# Patient Record
Sex: MALE | Race: WHITE | NOT HISPANIC OR LATINO | URBAN - METROPOLITAN AREA
[De-identification: names, ages, dates, MRNs, and addresses within clinical notes are randomized per-mention and may not be internally consistent; named-entity substitution may affect disease eponyms.]

---

## 2020-11-24 ENCOUNTER — OFFICE VISIT (OUTPATIENT)
Dept: URBAN - METROPOLITAN AREA CLINIC 118 | Facility: CLINIC | Age: 50
End: 2020-11-24
Payer: COMMERCIAL

## 2020-11-24 ENCOUNTER — WEB ENCOUNTER (OUTPATIENT)
Dept: URBAN - METROPOLITAN AREA CLINIC 118 | Facility: CLINIC | Age: 50
End: 2020-11-24

## 2020-11-24 DIAGNOSIS — R10.32 LLQ PAIN: ICD-10-CM

## 2020-11-24 PROCEDURE — G9903 PT SCRN TBCO ID AS NON USER: HCPCS | Performed by: INTERNAL MEDICINE

## 2020-11-24 PROCEDURE — G8482 FLU IMMUNIZE ORDER/ADMIN: HCPCS | Performed by: INTERNAL MEDICINE

## 2020-11-24 PROCEDURE — G8427 DOCREV CUR MEDS BY ELIG CLIN: HCPCS | Performed by: INTERNAL MEDICINE

## 2020-11-24 PROCEDURE — G8420 CALC BMI NORM PARAMETERS: HCPCS | Performed by: INTERNAL MEDICINE

## 2020-11-24 PROCEDURE — 3017F COLORECTAL CA SCREEN DOC REV: CPT | Performed by: INTERNAL MEDICINE

## 2020-11-24 PROCEDURE — 99213 OFFICE O/P EST LOW 20 MIN: CPT | Performed by: INTERNAL MEDICINE

## 2020-11-24 RX ORDER — OMEPRAZOLE 40 MG/1
CAPSULE, DELAYED RELEASE PELLETS ORAL
Qty: 0 | Refills: 0 | Status: ACTIVE | COMMUNITY
Start: 1900-01-01

## 2020-11-24 NOTE — HPI-TODAY'S VISIT:
This is a 49 yo male here for a follow up visit on abdominal pain.  Last seen in clinic in 09/2019 after colonoscopy. Colonoscopy in 2019showed a large submucosal nodule in the sigmoid colon, likely leiomyoma. Biopsies were not diagnostic. He had CT a/p which was unremarkable.  Since then, he has had intermittent episodes of LLQ abdominal pain and associated days of diarrhea. No weight loss or nausea/vomiting. No blood in the stools.

## 2020-12-15 ENCOUNTER — TELEPHONE ENCOUNTER (OUTPATIENT)
Dept: URBAN - METROPOLITAN AREA CLINIC 92 | Facility: CLINIC | Age: 50
End: 2020-12-15

## 2021-02-25 ENCOUNTER — OFFICE VISIT (OUTPATIENT)
Dept: URBAN - METROPOLITAN AREA CLINIC 118 | Facility: CLINIC | Age: 51
End: 2021-02-25

## 2023-07-19 ENCOUNTER — APPOINTMENT (RX ONLY)
Dept: URBAN - METROPOLITAN AREA CLINIC 33 | Facility: CLINIC | Age: 53
Setting detail: DERMATOLOGY
End: 2023-07-19

## 2023-07-19 DIAGNOSIS — D22 MELANOCYTIC NEVI: ICD-10-CM

## 2023-07-19 DIAGNOSIS — D18.0 HEMANGIOMA: ICD-10-CM

## 2023-07-19 DIAGNOSIS — L81.4 OTHER MELANIN HYPERPIGMENTATION: ICD-10-CM

## 2023-07-19 DIAGNOSIS — L82.1 OTHER SEBORRHEIC KERATOSIS: ICD-10-CM

## 2023-07-19 DIAGNOSIS — L81.5 LEUKODERMA, NOT ELSEWHERE CLASSIFIED: ICD-10-CM

## 2023-07-19 DIAGNOSIS — L919 UNSPECIFIED HYPERTROPHIC AND ATROPHIC CONDITIONS OF SKIN: ICD-10-CM

## 2023-07-19 DIAGNOSIS — L987 UNSPECIFIED HYPERTROPHIC AND ATROPHIC CONDITIONS OF SKIN: ICD-10-CM

## 2023-07-19 DIAGNOSIS — L909 UNSPECIFIED HYPERTROPHIC AND ATROPHIC CONDITIONS OF SKIN: ICD-10-CM

## 2023-07-19 DIAGNOSIS — L57.0 ACTINIC KERATOSIS: ICD-10-CM

## 2023-07-19 DIAGNOSIS — L57.3 POIKILODERMA OF CIVATTE: ICD-10-CM

## 2023-07-19 PROBLEM — D48.5 NEOPLASM OF UNCERTAIN BEHAVIOR OF SKIN: Status: ACTIVE | Noted: 2023-07-19

## 2023-07-19 PROBLEM — D18.01 HEMANGIOMA OF SKIN AND SUBCUTANEOUS TISSUE: Status: ACTIVE | Noted: 2023-07-19

## 2023-07-19 PROBLEM — D22.5 MELANOCYTIC NEVI OF TRUNK: Status: ACTIVE | Noted: 2023-07-19

## 2023-07-19 PROBLEM — D23.72 OTHER BENIGN NEOPLASM OF SKIN OF LEFT LOWER LIMB, INCLUDING HIP: Status: ACTIVE | Noted: 2023-07-19

## 2023-07-19 PROCEDURE — ? ADDITIONAL NOTES

## 2023-07-19 PROCEDURE — ? OBSERVATION AND MEASURE

## 2023-07-19 PROCEDURE — ? REFERRAL

## 2023-07-19 PROCEDURE — ? COUNSELING

## 2023-07-19 PROCEDURE — ? SUNSCREEN RECOMMENDATIONS

## 2023-07-19 PROCEDURE — ? PRESCRIPTION

## 2023-07-19 PROCEDURE — 99203 OFFICE O/P NEW LOW 30 MIN: CPT

## 2023-07-19 RX ORDER — FLUOROURACIL 5 MG/G
CREAM TOPICAL BID
Qty: 40 | Refills: 0 | Status: ERX | COMMUNITY
Start: 2023-07-19

## 2023-07-19 RX ADMIN — FLUOROURACIL: 5 CREAM TOPICAL at 00:00

## 2023-07-19 ASSESSMENT — LOCATION DETAILED DESCRIPTION DERM
LOCATION DETAILED: RIGHT CENTRAL MALAR CHEEK
LOCATION DETAILED: RIGHT PROXIMAL PRETIBIAL REGION
LOCATION DETAILED: RIGHT CLAVICULAR SKIN
LOCATION DETAILED: RIGHT SUPERIOR MEDIAL MIDBACK
LOCATION DETAILED: RIGHT BUTTOCK
LOCATION DETAILED: RIGHT SUPERIOR UPPER BACK
LOCATION DETAILED: RIGHT DISTAL PRETIBIAL REGION
LOCATION DETAILED: LEFT DISTAL PRETIBIAL REGION
LOCATION DETAILED: SUPERIOR THORACIC SPINE
LOCATION DETAILED: LEFT PROXIMAL PRETIBIAL REGION

## 2023-07-19 ASSESSMENT — LOCATION SIMPLE DESCRIPTION DERM
LOCATION SIMPLE: RIGHT CLAVICULAR SKIN
LOCATION SIMPLE: RIGHT UPPER BACK
LOCATION SIMPLE: RIGHT CHEEK
LOCATION SIMPLE: RIGHT LOWER BACK
LOCATION SIMPLE: UPPER BACK
LOCATION SIMPLE: RIGHT PRETIBIAL REGION
LOCATION SIMPLE: LEFT PRETIBIAL REGION
LOCATION SIMPLE: RIGHT BUTTOCK

## 2023-07-19 ASSESSMENT — LOCATION ZONE DERM
LOCATION ZONE: LEG
LOCATION ZONE: TRUNK
LOCATION ZONE: FACE

## 2023-07-19 NOTE — PROCEDURE: ADDITIONAL NOTES
Additional Notes: Patient accepts referral to general surgery
Render Risk Assessment In Note?: no
Detail Level: Simple

## 2023-07-19 NOTE — PROCEDURE: SUNSCREEN RECOMMENDATIONS

## 2023-08-07 ENCOUNTER — WEB ENCOUNTER (OUTPATIENT)
Dept: URBAN - METROPOLITAN AREA CLINIC 118 | Facility: CLINIC | Age: 53
End: 2023-08-07

## 2023-08-07 ENCOUNTER — OFFICE VISIT (OUTPATIENT)
Dept: URBAN - METROPOLITAN AREA CLINIC 118 | Facility: CLINIC | Age: 53
End: 2023-08-07
Payer: COMMERCIAL

## 2023-08-07 ENCOUNTER — DASHBOARD ENCOUNTERS (OUTPATIENT)
Age: 53
End: 2023-08-07

## 2023-08-07 VITALS
TEMPERATURE: 98.6 F | HEART RATE: 74 BPM | HEIGHT: 70 IN | SYSTOLIC BLOOD PRESSURE: 138 MMHG | WEIGHT: 184 LBS | BODY MASS INDEX: 26.34 KG/M2 | DIASTOLIC BLOOD PRESSURE: 69 MMHG

## 2023-08-07 DIAGNOSIS — R10.32 LLQ PAIN: ICD-10-CM

## 2023-08-07 PROCEDURE — 99213 OFFICE O/P EST LOW 20 MIN: CPT | Performed by: INTERNAL MEDICINE

## 2023-08-07 RX ORDER — FLUOROURACIL 50 MG/G
CREAM TOPICAL
Qty: 40 GRAM | Status: ACTIVE | COMMUNITY

## 2023-08-07 RX ORDER — CYCLOBENZAPRINE 5 MG/1
TAKE 1 TABLET BY MOUTH EVERY DAY AT BEDTIME AS NEEDED FOR 30 DAYS TABLET, FILM COATED ORAL
Qty: 30 EACH | Refills: 0 | Status: ACTIVE | COMMUNITY

## 2023-08-07 RX ORDER — OMEPRAZOLE 40 MG/1
CAPSULE, DELAYED RELEASE PELLETS ORAL
Qty: 0 | Refills: 0 | Status: ACTIVE | COMMUNITY
Start: 1900-01-01

## 2023-08-07 RX ORDER — PREDNISONE 20 MG/1
TABLET ORAL
Qty: 5 TABLET | Status: ACTIVE | COMMUNITY

## 2023-08-07 NOTE — HPI-TODAY'S VISIT:
This is a 54 yo male here for a follow up visit on abdominal pain.  Previously seen in 2019 and was referred to Dr. Cho, colorectal surgeon for large submucosal nodule in the sigmoid colon. He was evaluated in clinic and was planned for surgery. However, due to pandemic, elective surgeries were postponed. He has not been able to follow up with Dr. Cho since then.   He reports having intermittent episodes of LLQ pain without associated nausea/vomiting every few months.   No symptoms of diarrhea, constipation, rectal bleeding, or weight loss.

## 2023-08-10 ENCOUNTER — OFFICE VISIT (OUTPATIENT)
Dept: URBAN - METROPOLITAN AREA CLINIC 118 | Facility: CLINIC | Age: 53
End: 2023-08-10